# Patient Record
Sex: FEMALE | Race: WHITE | NOT HISPANIC OR LATINO | URBAN - METROPOLITAN AREA
[De-identification: names, ages, dates, MRNs, and addresses within clinical notes are randomized per-mention and may not be internally consistent; named-entity substitution may affect disease eponyms.]

---

## 2019-08-22 ENCOUNTER — OFFICE VISIT (OUTPATIENT)
Dept: URBAN - METROPOLITAN AREA CLINIC 76 | Facility: CLINIC | Age: 84
End: 2019-08-22
Payer: MEDICARE

## 2019-08-22 DIAGNOSIS — H52.4 PRESBYOPIA: ICD-10-CM

## 2019-08-22 DIAGNOSIS — H25.13 AGE-RELATED NUCLEAR CATARACT, BILATERAL: Primary | ICD-10-CM

## 2019-08-22 PROCEDURE — 99204 OFFICE O/P NEW MOD 45 MIN: CPT | Performed by: OPHTHALMOLOGY

## 2019-08-22 RX ORDER — OFLOXACIN 3 MG/ML
0.3 % SOLUTION/ DROPS OPHTHALMIC
Qty: 5 | Refills: 1 | Status: INACTIVE
Start: 2019-08-22 | End: 2019-08-22

## 2019-08-22 RX ORDER — DUREZOL 0.5 MG/ML
0.05 % EMULSION OPHTHALMIC
Qty: 5 | Refills: 1 | Status: INACTIVE
Start: 2019-08-22 | End: 2019-09-13

## 2019-08-22 ASSESSMENT — INTRAOCULAR PRESSURE
OD: 14
OS: 14

## 2019-08-22 ASSESSMENT — KERATOMETRY
OD: 42.38
OS: 43.25

## 2019-08-22 ASSESSMENT — VISUAL ACUITY
OD: 20/200
OS: 20/30

## 2019-08-22 NOTE — IMPRESSION/PLAN
Impression: Diagnosis: Age-related nuclear cataract, bilateral. Code: H25.13. Side: OU. Visually significant Plan: Cataracts account for the patient's complaints. Discussed all risks, benefits, procedures and recovery. Patient understands changing glasses will not improve vision. Discussed added risk due to astigmatism, Amblyopia OD. Patient desires to have surgery, recommend CE IOL OU, OD first.  Discussed iol options, recommend STANDARD IOL . TARGET: DISTANCE   RL2 Discussed astigmatism diagnosis with patient. Patient understands that standard lens does not correct for astigmatism and patient will need gls for distance and near after Cataract Surgery. Patient understands.

## 2019-09-11 ENCOUNTER — PRE-OPERATIVE VISIT (OUTPATIENT)
Dept: URBAN - METROPOLITAN AREA CLINIC 76 | Facility: CLINIC | Age: 84
End: 2019-09-11
Payer: MEDICARE

## 2019-09-11 PROCEDURE — 76519 ECHO EXAM OF EYE: CPT | Performed by: OPHTHALMOLOGY

## 2019-09-11 ASSESSMENT — PACHYMETRY
OD: 22.71
OD: 3.25
OS: 23.44
OS: 3.37

## 2019-09-23 ENCOUNTER — SURGERY (OUTPATIENT)
Dept: URBAN - METROPOLITAN AREA SURGERY 47 | Facility: SURGERY | Age: 84
End: 2019-09-23
Payer: MEDICARE

## 2019-09-23 PROCEDURE — 66984 XCAPSL CTRC RMVL W/O ECP: CPT | Performed by: OPHTHALMOLOGY

## 2019-09-24 ENCOUNTER — POST-OPERATIVE VISIT (OUTPATIENT)
Dept: URBAN - METROPOLITAN AREA CLINIC 76 | Facility: CLINIC | Age: 84
End: 2019-09-24
Payer: MEDICARE

## 2019-09-24 PROCEDURE — 99024 POSTOP FOLLOW-UP VISIT: CPT | Performed by: OPTOMETRIST

## 2019-09-24 ASSESSMENT — INTRAOCULAR PRESSURE
OD: 17
OS: 13

## 2019-10-01 ENCOUNTER — OFFICE VISIT (OUTPATIENT)
Dept: URBAN - METROPOLITAN AREA CLINIC 76 | Facility: CLINIC | Age: 84
End: 2019-10-01
Payer: MEDICARE

## 2019-10-01 DIAGNOSIS — H25.12 AGE-RELATED NUCLEAR CATARACT, LEFT EYE: Primary | ICD-10-CM

## 2019-10-01 DIAGNOSIS — Z09 ENCNTR FOR F/U EXAM AFT TRTMT FOR COND OTH THAN MALIG NEOPLM: ICD-10-CM

## 2019-10-01 DIAGNOSIS — H53.021 REFRACTIVE AMBLYOPIA OF RIGHT EYE: ICD-10-CM

## 2019-10-01 DIAGNOSIS — Z96.1 PRESENCE OF INTRAOCULAR LENS: ICD-10-CM

## 2019-10-01 PROCEDURE — 92012 INTRM OPH EXAM EST PATIENT: CPT | Performed by: OPHTHALMOLOGY

## 2019-10-01 ASSESSMENT — VISUAL ACUITY
OD: 20/200
OS: 20/30

## 2019-10-01 ASSESSMENT — INTRAOCULAR PRESSURE
OS: 14
OD: 10

## 2019-10-01 NOTE — IMPRESSION/PLAN
Impression: Diagnosis: Refractive amblyopia of right eye. Code: V55.707. Side: OD. Plan: Continue to monitor Discussed added risk and vision limitations.

## 2019-10-01 NOTE — IMPRESSION/PLAN
Impression: Age-related nuclear cataract, left eye: H25.12. OS. Visually significant Plan: Cataracts account for the patient's complaints. Discussed all risks, benefits, procedures and recovery. Patient understands changing glasses will not improve vision. Discussed added risk due to astigmatism, Amblyopia OD. Patient desires to have surgery, recommend CE IOL OS. Discussed iol options, recommend STANDARD IOL . TARGET: DISTANCE   RL2 Discussed astigmatism diagnosis with patient. Patient understands that standard lens does not correct for astigmatism and patient will need gls for distance and near after Cataract Surgery. Patient understands.

## 2019-11-11 ENCOUNTER — SURGERY (OUTPATIENT)
Dept: URBAN - METROPOLITAN AREA SURGERY 47 | Facility: SURGERY | Age: 84
End: 2019-11-11
Payer: MEDICARE

## 2019-11-11 PROCEDURE — 66984 XCAPSL CTRC RMVL W/O ECP: CPT | Performed by: OPHTHALMOLOGY

## 2019-11-12 ENCOUNTER — POST-OPERATIVE VISIT (OUTPATIENT)
Dept: URBAN - METROPOLITAN AREA CLINIC 76 | Facility: CLINIC | Age: 84
End: 2019-11-12
Payer: MEDICARE

## 2019-11-12 PROCEDURE — 99024 POSTOP FOLLOW-UP VISIT: CPT | Performed by: OPTOMETRIST

## 2019-11-12 RX ORDER — OFLOXACIN 3 MG/ML
0.3 % SOLUTION/ DROPS OPHTHALMIC
Qty: 5 | Refills: 1 | Status: INACTIVE
Start: 2019-11-12 | End: 2019-12-10

## 2019-11-12 RX ORDER — PREDNISOLONE ACETATE 10 MG/ML
1 % SUSPENSION/ DROPS OPHTHALMIC
Qty: 5 | Refills: 1 | Status: INACTIVE
Start: 2019-11-12 | End: 2019-11-12

## 2019-11-12 ASSESSMENT — INTRAOCULAR PRESSURE
OD: 10
OS: 21

## 2019-11-19 ENCOUNTER — POST-OPERATIVE VISIT (OUTPATIENT)
Dept: URBAN - METROPOLITAN AREA CLINIC 76 | Facility: CLINIC | Age: 84
End: 2019-11-19
Payer: MEDICARE

## 2019-11-19 PROCEDURE — 99024 POSTOP FOLLOW-UP VISIT: CPT | Performed by: OPTOMETRIST

## 2019-11-19 ASSESSMENT — INTRAOCULAR PRESSURE
OS: 14
OD: 14

## 2019-11-19 ASSESSMENT — VISUAL ACUITY
OS: 20/30-
OD: 20/200

## 2019-12-10 ENCOUNTER — POST-OPERATIVE VISIT (OUTPATIENT)
Dept: URBAN - METROPOLITAN AREA CLINIC 76 | Facility: CLINIC | Age: 84
End: 2019-12-10
Payer: MEDICARE

## 2019-12-10 PROCEDURE — 99024 POSTOP FOLLOW-UP VISIT: CPT | Performed by: OPTOMETRIST

## 2019-12-10 ASSESSMENT — VISUAL ACUITY
OS: 20/25-3
OD: 20/100

## 2019-12-10 ASSESSMENT — INTRAOCULAR PRESSURE
OD: 14
OS: 14

## 2020-09-23 ENCOUNTER — OFFICE VISIT (OUTPATIENT)
Dept: URBAN - METROPOLITAN AREA CLINIC 76 | Facility: CLINIC | Age: 85
End: 2020-09-23
Payer: MEDICARE

## 2020-09-23 DIAGNOSIS — Z98.42 CATARACT EXTRACTION STATUS, LEFT EYE: ICD-10-CM

## 2020-09-23 DIAGNOSIS — H04.123 DRY EYE SYNDROME OF BILATERAL LACRIMAL GLANDS: ICD-10-CM

## 2020-09-23 DIAGNOSIS — Z98.41 CATARACT EXTRACTION STATUS, RIGHT EYE: ICD-10-CM

## 2020-09-23 PROCEDURE — 92014 COMPRE OPH EXAM EST PT 1/>: CPT | Performed by: OPTOMETRIST

## 2020-09-23 ASSESSMENT — INTRAOCULAR PRESSURE
OD: 12
OS: 11

## 2020-09-23 NOTE — IMPRESSION/PLAN
Impression: Diagnosis: Refractive amblyopia of right eye. Code: Q34.456. Side: OD. Plan: Continue to monitor cause for  vision limitations.

## 2020-09-23 NOTE — IMPRESSION/PLAN
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 Warm compresses with lid massage from top / down, bottom / up, and sweep from inside / out x 2. Patient instructed to use emulsive base lubricant 3-4 x a day. Increase omega foods/nuts and/or Borage oil supplement 1500-2500mg capsule orally per day.

## 2021-11-09 ENCOUNTER — OFFICE VISIT (OUTPATIENT)
Dept: URBAN - METROPOLITAN AREA CLINIC 76 | Facility: CLINIC | Age: 86
End: 2021-11-09
Payer: MEDICARE

## 2021-11-09 DIAGNOSIS — H10.45 OTHER CHRONIC ALLERGIC CONJUNCTIVITIS: ICD-10-CM

## 2021-11-09 DIAGNOSIS — H43.812 VITREOUS DEGENERATION, LEFT EYE: ICD-10-CM

## 2021-11-09 PROCEDURE — 99213 OFFICE O/P EST LOW 20 MIN: CPT | Performed by: OPTOMETRIST

## 2021-11-09 ASSESSMENT — KERATOMETRY
OD: 42.88
OS: 43.38

## 2021-11-09 ASSESSMENT — INTRAOCULAR PRESSURE
OS: 11
OD: 10

## 2022-11-09 ENCOUNTER — OFFICE VISIT (OUTPATIENT)
Dept: URBAN - METROPOLITAN AREA CLINIC 76 | Facility: CLINIC | Age: 87
End: 2022-11-09
Payer: MEDICARE

## 2022-11-09 DIAGNOSIS — Z96.1 PRESENCE OF INTRAOCULAR LENS: Primary | ICD-10-CM

## 2022-11-09 DIAGNOSIS — H52.4 PRESBYOPIA: ICD-10-CM

## 2022-11-09 DIAGNOSIS — H53.021 REFRACTIVE AMBLYOPIA OF RIGHT EYE: ICD-10-CM

## 2022-11-09 DIAGNOSIS — H43.812 VITREOUS DEGENERATION, LEFT EYE: ICD-10-CM

## 2022-11-09 PROCEDURE — 99214 OFFICE O/P EST MOD 30 MIN: CPT | Performed by: OPTOMETRIST

## 2022-11-09 ASSESSMENT — INTRAOCULAR PRESSURE
OS: 11
OD: 12

## 2022-11-09 ASSESSMENT — KERATOMETRY
OD: 43.38
OS: 43.38

## 2022-11-09 ASSESSMENT — VISUAL ACUITY
OD: 20/200
OS: 20/30

## 2022-11-09 NOTE — IMPRESSION/PLAN
Impression: Diagnosis: Refractive amblyopia of right eye. Code: Q65.885. Side: OD. Right. Plan: Continue to monitor.